# Patient Record
Sex: MALE
[De-identification: names, ages, dates, MRNs, and addresses within clinical notes are randomized per-mention and may not be internally consistent; named-entity substitution may affect disease eponyms.]

---

## 2022-06-28 PROBLEM — Z00.00 ENCOUNTER FOR PREVENTIVE HEALTH EXAMINATION: Status: ACTIVE | Noted: 2022-06-28

## 2022-06-29 ENCOUNTER — APPOINTMENT (OUTPATIENT)
Dept: RHEUMATOLOGY | Facility: CLINIC | Age: 72
End: 2022-06-29

## 2022-06-29 VITALS
HEART RATE: 70 BPM | WEIGHT: 115 LBS | TEMPERATURE: 97.2 F | SYSTOLIC BLOOD PRESSURE: 133 MMHG | HEIGHT: 63 IN | BODY MASS INDEX: 20.38 KG/M2 | DIASTOLIC BLOOD PRESSURE: 74 MMHG | OXYGEN SATURATION: 98 %

## 2022-06-29 DIAGNOSIS — Z86.39 PERSONAL HISTORY OF OTHER ENDOCRINE, NUTRITIONAL AND METABOLIC DISEASE: ICD-10-CM

## 2022-06-29 DIAGNOSIS — Z78.9 OTHER SPECIFIED HEALTH STATUS: ICD-10-CM

## 2022-06-29 DIAGNOSIS — I10 ESSENTIAL (PRIMARY) HYPERTENSION: ICD-10-CM

## 2022-06-29 DIAGNOSIS — N40.0 BENIGN PROSTATIC HYPERPLASIA WITHOUT LOWER URINARY TRACT SYMPMS: ICD-10-CM

## 2022-06-29 DIAGNOSIS — M17.10 UNILATERAL PRIMARY OSTEOARTHRITIS, UNSPECIFIED KNEE: ICD-10-CM

## 2022-06-29 PROCEDURE — 99204 OFFICE O/P NEW MOD 45 MIN: CPT | Mod: 25

## 2022-06-29 PROCEDURE — 20610 DRAIN/INJ JOINT/BURSA W/O US: CPT | Mod: RT

## 2022-06-29 RX ORDER — METHYLPRED ACET/NACL,ISO-OS/PF 80 MG/ML
80 VIAL (ML) INJECTION
Qty: 1 | Refills: 0 | Status: COMPLETED | OUTPATIENT
Start: 2022-06-29

## 2022-06-29 RX ORDER — LIDOCAINE HYDROCHLORIDE 10 MG/ML
1 INJECTION, SOLUTION INFILTRATION; PERINEURAL
Refills: 0 | Status: COMPLETED | OUTPATIENT
Start: 2022-06-29

## 2022-06-29 RX ORDER — METOPROLOL SUCCINATE 25 MG/1
25 TABLET, EXTENDED RELEASE ORAL
Qty: 90 | Refills: 0 | Status: ACTIVE | COMMUNITY
Start: 2022-02-12

## 2022-06-29 RX ORDER — ATORVASTATIN CALCIUM 40 MG/1
40 TABLET, FILM COATED ORAL
Qty: 90 | Refills: 0 | Status: ACTIVE | COMMUNITY
Start: 2022-02-12

## 2022-06-29 RX ORDER — TAMSULOSIN HYDROCHLORIDE 0.4 MG/1
0.4 CAPSULE ORAL
Qty: 90 | Refills: 0 | Status: ACTIVE | COMMUNITY
Start: 2022-06-29

## 2022-06-29 RX ADMIN — METHYLPREDNISOLONE ACETATE MG/ML: 80 INJECTION, SUSPENSION INTRA-ARTICULAR; INTRALESIONAL; INTRAMUSCULAR; SOFT TISSUE at 00:00

## 2022-06-29 RX ADMIN — Medication %: at 00:00

## 2022-06-29 NOTE — PHYSICAL EXAM
[General Appearance - Alert] : alert [General Appearance - In No Acute Distress] : in no acute distress [Sclera] : the sclera and conjunctiva were normal [PERRL With Normal Accommodation] : pupils were equal in size, round, and reactive to light [Extraocular Movements] : extraocular movements were intact [Outer Ear] : the ears and nose were normal in appearance [Oropharynx] : the oropharynx was normal [Neck Appearance] : the appearance of the neck was normal [Neck Cervical Mass (___cm)] : no neck mass was observed [Jugular Venous Distention Increased] : there was no jugular-venous distention [Thyroid Diffuse Enlargement] : the thyroid was not enlarged [Thyroid Nodule] : there were no palpable thyroid nodules [Auscultation Breath Sounds / Voice Sounds] : lungs were clear to auscultation bilaterally [Heart Rate And Rhythm] : heart rate was normal and rhythm regular [Heart Sounds] : normal S1 and S2 [Heart Sounds Gallop] : no gallops [Murmurs] : no murmurs [Heart Sounds Pericardial Friction Rub] : no pericardial rub [Full Pulse] : the pedal pulses are present [Edema] : there was no peripheral edema [Cervical Lymph Nodes Enlarged Posterior Bilaterally] : posterior cervical [Cervical Lymph Nodes Enlarged Anterior Bilaterally] : anterior cervical [Supraclavicular Lymph Nodes Enlarged Bilaterally] : supraclavicular [Axillary Lymph Nodes Enlarged Bilaterally] : axillary [No CVA Tenderness] : no ~M costovertebral angle tenderness [No Spinal Tenderness] : no spinal tenderness [Abnormal Walk] : normal gait [Nail Clubbing] : no clubbing  or cyanosis of the fingernails [Motor Tone] : muscle strength and tone were normal [Skin Color & Pigmentation] : normal skin color and pigmentation [Skin Turgor] : normal skin turgor [] : no rash [No Focal Deficits] : no focal deficits [Oriented To Time, Place, And Person] : oriented to person, place, and time [Impaired Insight] : insight and judgment were intact [Affect] : the affect was normal [FreeTextEntry1] : R knee swelling, bowing of the legs with joint line tenderness

## 2022-06-29 NOTE — HISTORY OF PRESENT ILLNESS
[Currently Experiencing] : currently [Arthralgias] : arthralgias [Joint Deformity] : joint deformity [Decreased ROM] : decreased range of motion [Difficulty Standing] : difficulty standing [Difficulty Walking] : difficulty walking [FreeTextEntry1] : 71 year old male here for evaluation of bilateral knee pain \par \par C/o pain in the R knee along with thigh and calf pain in the same leg. Has similar symptoms in the L knee but not as severe. Had x-ray last year and was told he had R knee effusion. Symptoms for the past 1 year. No pain at rest or when sleeping. Pain is mostly with walking, climbing stairs. Feels like he is losing his balance and his leg is giving way. He has not noticed the swelling in his knees. Has not taken any medications for his symptoms. Has not tried PT or any injections for his knee. Has not been evaluated by an orthopedic doctor. No other joint pains or swelling. \par \par Denies any constitutional symptoms - fevers, chills, malaise, weight loss, myalgias. No h/o rashes, uveitis, hair loss, headaches, mucosal ulcers, Raynaud's, sicca symptoms, GI or  issues, serositis, pleuritis, pericarditis, weakness or paresthesias. No h/o blood clots.\par  [Anorexia] : no anorexia [Weight Loss] : no weight loss [Malaise] : no malaise [Fever] : no fever [Chills] : no chills [Fatigue] : no fatigue [Depression] : no depression [Malar Facial Rash] : no malar facial rash [Skin Lesions] : no lesions [Skin Nodules] : no skin nodules [Oral Ulcers] : no oral ulcers [Cough] : no cough [Dry Mouth] : no dry mouth [Dysphonia] : no dysphonia [Dysphagia] : no dysphagia [Shortness of Breath] : no shortness of breath [Chest Pain] : no chest pain [Joint Swelling] : no joint swelling [Joint Warmth] : no joint warmth [Morning Stiffness] : no morning stiffness [Falls] : no falls [Dyspnea] : no dyspnea [Muscle Weakness] : no muscle weakness [Myalgias] : no myalgias [Muscle Spasms] : no muscle spasms [Muscle Cramping] : no muscle cramping [Visual Changes] : no visual changes [Eye Pain] : no eye pain [Eye Redness] : no eye redness [Dry Eyes] : no dry eyes

## 2022-06-29 NOTE — PROCEDURE
[Today's Date:] : Date: [unfilled] [Risks] : risks [Benefits] : benefits [Alternatives] : alternatives [Consent Obtained] : written consent was obtained prior to the procedure and is detailed in the patient's record [Patient] : Prior to the start of the procedure a time out was taken and the identity of the patient was confirmed via name and date of birth with the patient. The correct site and the procedure to be performed were confirmed. The correct side was confirmed if applicable. The availability of the correct equipment was verified [Diagnostic] : diagnostic  [Therapeutic] : therapeutic [#1 Site: ______] : #1 site identified in the [unfilled] [___ ml Inj] : [unfilled] ~Uml [1%] : 1%  [Without Epi] : without epinephrine [Chlorhexidine] : chlorhexidine [21 gauge 1.5 inch] : A 21 gauge 1.5 inch needle was used [___ml of fluid] : [unfilled] ml of fluid was aspirated [Crystal Identification] : crystal identification [Cell Count] : cell count [Gram Stain & Culture] : gram stain and culture [Depomedrol ___ mg] : Depomedrol [unfilled] mg [Tolerated Well] : the patient tolerated the procedure well [Reports Improvement in Symptoms] : reports improvement in symptoms [No Complications] : there were no complications [Instructions Given] : handouts/patient instructions were given to patient [Patient Instructed to Call] : patient was instructed to call if redness at site, a decrease in range of motion or an increase in pain is noted after procedure. [de-identified] : serosanguineous, clear

## 2022-06-29 NOTE — ASSESSMENT
[FreeTextEntry1] : 71 year old male here for evaluation of bilateral knee pain \par \par 1. Knee pain: R > L secondary to advance OA. Likely will need TKR in the near future. R knee arthrocentesis performed today with intra-articular steroid injection. Fluid sent for analysis. Please see procedure note for details. Referred for PT. \par \par Follow up in 3 months

## 2022-06-30 LAB
B PERT IGG+IGM PNL SER: ABNORMAL
COLOR FLD: YELLOW
EOSINOPHIL # FLD MANUAL: 0 %
FLUID INTAKE SUBSTANCE CLASS: NORMAL
LYMPHOCYTES # FLD MANUAL: 27 %
MESOTHL CELL NFR FLD: 0 %
MONOS+MACROS NFR FLD MANUAL: 69 %
NEUTS SEG # FLD MANUAL: 4 %
NRBC # FLD: 0 %
RBC # FLD MANUAL: 337 /UL
SYCRY CLARITY: ABNORMAL
SYCRY COLOR: YELLOW
SYCRY ID: NORMAL
SYCRY TUBE: NORMAL
TOTAL CELLS COUNTED FLD: 187 /UL
TUBE TYPE: NORMAL
UNIDENT CELLS NFR FLD MANUAL: 0 %
VARIANT LYMPHS # FLD MANUAL: 0 %

## 2022-07-14 LAB — BACTERIA FLD CULT: NORMAL

## 2024-09-23 ENCOUNTER — INPATIENT (INPATIENT)
Facility: HOSPITAL | Age: 74
LOS: 0 days | End: 2024-09-24
Attending: HOSPITALIST | Admitting: HOSPITALIST
Payer: MEDICARE

## 2024-09-23 VITALS
DIASTOLIC BLOOD PRESSURE: 83 MMHG | WEIGHT: 125 LBS | TEMPERATURE: 98 F | RESPIRATION RATE: 18 BRPM | HEART RATE: 70 BPM | OXYGEN SATURATION: 98 % | HEIGHT: 63 IN | SYSTOLIC BLOOD PRESSURE: 119 MMHG

## 2024-09-23 VITALS
OXYGEN SATURATION: 100 % | SYSTOLIC BLOOD PRESSURE: 123 MMHG | RESPIRATION RATE: 18 BRPM | HEART RATE: 70 BPM | DIASTOLIC BLOOD PRESSURE: 63 MMHG | TEMPERATURE: 98 F

## 2024-09-23 DIAGNOSIS — N40.0 BENIGN PROSTATIC HYPERPLASIA WITHOUT LOWER URINARY TRACT SYMPTOMS: ICD-10-CM

## 2024-09-23 DIAGNOSIS — E78.5 HYPERLIPIDEMIA, UNSPECIFIED: ICD-10-CM

## 2024-09-23 DIAGNOSIS — Z29.9 ENCOUNTER FOR PROPHYLACTIC MEASURES, UNSPECIFIED: ICD-10-CM

## 2024-09-23 DIAGNOSIS — K72.90 HEPATIC FAILURE, UNSPECIFIED WITHOUT COMA: ICD-10-CM

## 2024-09-23 DIAGNOSIS — R53.1 WEAKNESS: ICD-10-CM

## 2024-09-23 DIAGNOSIS — C22.1 INTRAHEPATIC BILE DUCT CARCINOMA: ICD-10-CM

## 2024-09-23 LAB
ADD ON TEST-SPECIMEN IN LAB: SIGNIFICANT CHANGE UP
ALBUMIN SERPL ELPH-MCNC: 2.9 G/DL — LOW (ref 3.3–5)
ALP SERPL-CCNC: 647 U/L — HIGH (ref 40–120)
ALT FLD-CCNC: 710 U/L — HIGH (ref 4–41)
ANION GAP SERPL CALC-SCNC: 13 MMOL/L — SIGNIFICANT CHANGE UP (ref 7–14)
APPEARANCE UR: ABNORMAL
APTT BLD: 43.3 SEC — HIGH (ref 24.5–35.6)
AST SERPL-CCNC: 2231 U/L — HIGH (ref 4–40)
BACTERIA # UR AUTO: NEGATIVE /HPF — SIGNIFICANT CHANGE UP
BASOPHILS # BLD AUTO: 0.05 K/UL — SIGNIFICANT CHANGE UP (ref 0–0.2)
BASOPHILS NFR BLD AUTO: 0.5 % — SIGNIFICANT CHANGE UP (ref 0–2)
BILIRUB SERPL-MCNC: 18 MG/DL — HIGH (ref 0.2–1.2)
BILIRUB UR-MCNC: ABNORMAL
BLD GP AB SCN SERPL QL: NEGATIVE — SIGNIFICANT CHANGE UP
BUN SERPL-MCNC: 27 MG/DL — HIGH (ref 7–23)
CALCIUM SERPL-MCNC: 8.8 MG/DL — SIGNIFICANT CHANGE UP (ref 8.4–10.5)
CAST: 0 /LPF — SIGNIFICANT CHANGE UP (ref 0–4)
CHLORIDE SERPL-SCNC: 97 MMOL/L — LOW (ref 98–107)
CO2 SERPL-SCNC: 21 MMOL/L — LOW (ref 22–31)
COLOR SPEC: SIGNIFICANT CHANGE UP
CREAT SERPL-MCNC: 0.89 MG/DL — SIGNIFICANT CHANGE UP (ref 0.5–1.3)
DIFF PNL FLD: ABNORMAL
EGFR: 89 ML/MIN/1.73M2 — SIGNIFICANT CHANGE UP
EOSINOPHIL # BLD AUTO: 0.02 K/UL — SIGNIFICANT CHANGE UP (ref 0–0.5)
EOSINOPHIL NFR BLD AUTO: 0.2 % — SIGNIFICANT CHANGE UP (ref 0–6)
GAS PNL BLDV: SIGNIFICANT CHANGE UP
GLUCOSE SERPL-MCNC: 122 MG/DL — HIGH (ref 70–99)
GLUCOSE UR QL: 100 MG/DL
HCT VFR BLD CALC: 33.2 % — LOW (ref 39–50)
HGB BLD-MCNC: 11.9 G/DL — LOW (ref 13–17)
IANC: 8.02 K/UL — HIGH (ref 1.8–7.4)
IMM GRANULOCYTES NFR BLD AUTO: 1.2 % — HIGH (ref 0–0.9)
INR BLD: 2.64 RATIO — HIGH (ref 0.85–1.18)
KETONES UR-MCNC: NEGATIVE MG/DL — SIGNIFICANT CHANGE UP
LEUKOCYTE ESTERASE UR-ACNC: ABNORMAL
LIDOCAIN IGE QN: 67 U/L — HIGH (ref 7–60)
LYMPHOCYTES # BLD AUTO: 0.96 K/UL — LOW (ref 1–3.3)
LYMPHOCYTES # BLD AUTO: 9.2 % — LOW (ref 13–44)
MCHC RBC-ENTMCNC: 29.2 PG — SIGNIFICANT CHANGE UP (ref 27–34)
MCHC RBC-ENTMCNC: 35.8 GM/DL — SIGNIFICANT CHANGE UP (ref 32–36)
MCV RBC AUTO: 81.6 FL — SIGNIFICANT CHANGE UP (ref 80–100)
MONOCYTES # BLD AUTO: 1.26 K/UL — HIGH (ref 0–0.9)
MONOCYTES NFR BLD AUTO: 12.1 % — SIGNIFICANT CHANGE UP (ref 2–14)
NEUTROPHILS # BLD AUTO: 8.02 K/UL — HIGH (ref 1.8–7.4)
NEUTROPHILS NFR BLD AUTO: 76.8 % — SIGNIFICANT CHANGE UP (ref 43–77)
NITRITE UR-MCNC: NEGATIVE — SIGNIFICANT CHANGE UP
NRBC # BLD: 0 /100 WBCS — SIGNIFICANT CHANGE UP (ref 0–0)
NRBC # FLD: 0.03 K/UL — HIGH (ref 0–0)
PH UR: 6 — SIGNIFICANT CHANGE UP (ref 5–8)
PLATELET # BLD AUTO: 254 K/UL — SIGNIFICANT CHANGE UP (ref 150–400)
POTASSIUM SERPL-MCNC: 4.6 MMOL/L — SIGNIFICANT CHANGE UP (ref 3.5–5.3)
POTASSIUM SERPL-SCNC: 4.6 MMOL/L — SIGNIFICANT CHANGE UP (ref 3.5–5.3)
PROT SERPL-MCNC: 6.3 G/DL — SIGNIFICANT CHANGE UP (ref 6–8.3)
PROT UR-MCNC: 300 MG/DL
PROTHROM AB SERPL-ACNC: 28.7 SEC — HIGH (ref 9.5–13)
RBC # BLD: 4.07 M/UL — LOW (ref 4.2–5.8)
RBC # FLD: 24.2 % — HIGH (ref 10.3–14.5)
RBC CASTS # UR COMP ASSIST: 28 /HPF — HIGH (ref 0–4)
REVIEW: SIGNIFICANT CHANGE UP
RH IG SCN BLD-IMP: POSITIVE — SIGNIFICANT CHANGE UP
SODIUM SERPL-SCNC: 131 MMOL/L — LOW (ref 135–145)
SP GR SPEC: 1.02 — SIGNIFICANT CHANGE UP (ref 1–1.03)
SQUAMOUS # UR AUTO: 1 /HPF — SIGNIFICANT CHANGE UP (ref 0–5)
UROBILINOGEN FLD QL: 0.2 MG/DL — SIGNIFICANT CHANGE UP (ref 0.2–1)
WBC # BLD: 10.44 K/UL — SIGNIFICANT CHANGE UP (ref 3.8–10.5)
WBC # FLD AUTO: 10.44 K/UL — SIGNIFICANT CHANGE UP (ref 3.8–10.5)
WBC UR QL: 0 /HPF — SIGNIFICANT CHANGE UP (ref 0–5)

## 2024-09-23 PROCEDURE — 70460 CT HEAD/BRAIN W/DYE: CPT | Mod: 26,MC

## 2024-09-23 PROCEDURE — 74177 CT ABD & PELVIS W/CONTRAST: CPT | Mod: 26,MC

## 2024-09-23 PROCEDURE — 72126 CT NECK SPINE W/DYE: CPT | Mod: 26,MC

## 2024-09-23 PROCEDURE — 99233 SBSQ HOSP IP/OBS HIGH 50: CPT | Mod: GC

## 2024-09-23 PROCEDURE — 71260 CT THORAX DX C+: CPT | Mod: 26,MC

## 2024-09-23 PROCEDURE — 72129 CT CHEST SPINE W/DYE: CPT | Mod: 26,MC

## 2024-09-23 PROCEDURE — 99285 EMERGENCY DEPT VISIT HI MDM: CPT

## 2024-09-23 PROCEDURE — 72132 CT LUMBAR SPINE W/DYE: CPT | Mod: 26,MC

## 2024-09-23 RX ORDER — TAMSULOSIN HCL 0.4 MG
0.4 CAPSULE ORAL AT BEDTIME
Refills: 0 | Status: DISCONTINUED | OUTPATIENT
Start: 2024-09-23 | End: 2024-09-24

## 2024-09-23 RX ORDER — SODIUM CHLORIDE 0.9 % (FLUSH) 0.9 %
1000 SYRINGE (ML) INJECTION ONCE
Refills: 0 | Status: COMPLETED | OUTPATIENT
Start: 2024-09-23 | End: 2024-09-23

## 2024-09-23 RX ORDER — ATORVASTATIN CALCIUM 10 MG/1
80 TABLET, FILM COATED ORAL AT BEDTIME
Refills: 0 | Status: DISCONTINUED | OUTPATIENT
Start: 2024-09-23 | End: 2024-09-24

## 2024-09-23 RX ORDER — ENOXAPARIN SODIUM 150 MG/ML
40 INJECTION SUBCUTANEOUS EVERY 24 HOURS
Refills: 0 | Status: DISCONTINUED | OUTPATIENT
Start: 2024-09-23 | End: 2024-09-23

## 2024-09-23 RX ORDER — ACETAMINOPHEN 325 MG
650 TABLET ORAL EVERY 6 HOURS
Refills: 0 | Status: DISCONTINUED | OUTPATIENT
Start: 2024-09-23 | End: 2024-09-24

## 2024-09-23 RX ORDER — 5-HYDROXYTRYPTOPHAN (5-HTP) 100 MG
3 TABLET,DISINTEGRATING ORAL AT BEDTIME
Refills: 0 | Status: DISCONTINUED | OUTPATIENT
Start: 2024-09-23 | End: 2024-09-24

## 2024-09-23 RX ORDER — INFLUENZA VIRUS VACCINE 15; 15; 15; 15 UG/.5ML; UG/.5ML; UG/.5ML; UG/.5ML
0.5 SUSPENSION INTRAMUSCULAR ONCE
Refills: 0 | Status: DISCONTINUED | OUTPATIENT
Start: 2024-09-23 | End: 2024-09-24

## 2024-09-23 RX ORDER — MAG HYDROX/ALUMINUM HYD/SIMETH 200-200-20
30 SUSPENSION, ORAL (FINAL DOSE FORM) ORAL EVERY 4 HOURS
Refills: 0 | Status: DISCONTINUED | OUTPATIENT
Start: 2024-09-23 | End: 2024-09-24

## 2024-09-23 RX ORDER — ONDANSETRON HCL/PF 4 MG/2 ML
4 VIAL (ML) INJECTION EVERY 8 HOURS
Refills: 0 | Status: DISCONTINUED | OUTPATIENT
Start: 2024-09-23 | End: 2024-09-24

## 2024-09-23 RX ORDER — METOPROLOL TARTRATE 50 MG
25 TABLET ORAL DAILY
Refills: 0 | Status: DISCONTINUED | OUTPATIENT
Start: 2024-09-23 | End: 2024-09-24

## 2024-09-23 RX ORDER — SODIUM CHLORIDE 0.9 % (FLUSH) 0.9 %
1000 SYRINGE (ML) INJECTION
Refills: 0 | Status: DISCONTINUED | OUTPATIENT
Start: 2024-09-23 | End: 2024-09-24

## 2024-09-23 RX ADMIN — Medication 5000 UNIT(S): at 22:23

## 2024-09-23 RX ADMIN — Medication 4 MILLIGRAM(S): at 19:38

## 2024-09-23 RX ADMIN — Medication 100 MILLILITER(S): at 18:47

## 2024-09-23 RX ADMIN — Medication 0.4 MILLIGRAM(S): at 22:23

## 2024-09-23 RX ADMIN — ATORVASTATIN CALCIUM 80 MILLIGRAM(S): 10 TABLET, FILM COATED ORAL at 22:22

## 2024-09-23 RX ADMIN — Medication 1000 MILLILITER(S): at 12:52

## 2024-09-23 NOTE — H&P ADULT - NSHPPHYSICALEXAM_GEN_ALL_CORE
LOS:     VITALS:   T(C): 36.3 (09-23-24 @ 12:45), Max: 36.7 (09-23-24 @ 10:03)  HR: 75 (09-23-24 @ 12:45) (70 - 75)  BP: 118/63 (09-23-24 @ 12:45) (118/63 - 119/83)  RR: 18 (09-23-24 @ 12:45) (18 - 18)  SpO2: 100% (09-23-24 @ 12:45) (98% - 100%)    GENERAL: NAD  HEAD:  Atraumatic, Normocephalic  EYES: EOMI, PERRLA, conjunctiva and sclera clear  ENT: Moist mucous membranes  NECK: Supple, No elevated JVP.  CHEST/LUNG: Clear to auscultation bilaterally; No rales, rhonchi, or wheezes.   HEART: Regular rate and rhythm; No murmurs, rubs, or gallops  ABDOMEN: Bowel sounds present; Soft, nontender, nondistended  EXTREMITIES:  2+ Peripheral Pulses, brisk capillary refill. No clubbing, cyanosis, or edema  NERVOUS SYSTEM:  A&Ox3, no focal deficits   SKIN: No rashes or lesions LOS:     VITALS:   T(C): 36.3 (09-23-24 @ 12:45), Max: 36.7 (09-23-24 @ 10:03)  HR: 75 (09-23-24 @ 12:45) (70 - 75)  BP: 118/63 (09-23-24 @ 12:45) (118/63 - 119/83)  RR: 18 (09-23-24 @ 12:45) (18 - 18)  SpO2: 100% (09-23-24 @ 12:45) (98% - 100%)    GENERAL: NAD, appears weak  HEAD:  Atraumatic, Normocephalic  EYES: EOMI, PERRLA, scleral icterus  ENT: Moist mucous membranes  NECK: Supple, No elevated JVP.  CHEST/LUNG: Clear to auscultation bilaterally; No rales, rhonchi, or wheezes.   HEART: Regular rate and rhythm; No murmurs, rubs, or gallops  ABDOMEN: Bowel sounds present; Soft, tenderness to palpitation to RUQ, nondistended  EXTREMITIES:  2+ Peripheral Pulses, brisk capillary refill. No clubbing, cyanosis, or edema  NERVOUS SYSTEM:  A&Ox3, no focal deficits   SKIN: +Jaundice LOS:     VITALS:   T(C): 36.3 (09-23-24 @ 12:45), Max: 36.7 (09-23-24 @ 10:03)  HR: 75 (09-23-24 @ 12:45) (70 - 75)  BP: 118/63 (09-23-24 @ 12:45) (118/63 - 119/83)  RR: 18 (09-23-24 @ 12:45) (18 - 18)  SpO2: 100% (09-23-24 @ 12:45) (98% - 100%)    GENERAL: NAD, appears weak  HEAD:  Atraumatic, Normocephalic  EYES: EOMI, PERRLA, scleral icterus  ENT: Moist mucous membranes  NECK: Supple, No elevated JVP.  CHEST/LUNG: Clear to auscultation bilaterally; No rales, rhonchi, or wheezes.   HEART: Regular rate and rhythm; No murmurs, rubs, or gallops  ABDOMEN: Bowel sounds present; Soft, tenderness to palpitation to RUQ, nondistended  EXTREMITIES:  2+ Peripheral Pulses, brisk capillary refill. No clubbing, cyanosis, or edema  NERVOUS SYSTEM:  A&Ox3, no focal deficits but strength 3/5 UE- able to hold his arms up, 2/5 LE bilateral, sensation intact  SKIN: +Jaundice

## 2024-09-23 NOTE — CONSULT NOTE ADULT - ASSESSMENT
74y (1950) hx BPH HLD cholangiocarcinoma recently diagnosed 9/10/24 attempted biliary stenting on 9/18 which failed. Patient subsequently experienced weakness in the right leg since then that has progressively worsened to involve bilateral lower limbs alongside generalized weakness. Exam reveals proximal bilateral lower limbs weakness. CT brain and CT C T L spine are not concerning.    impression: bilateral lower limb weakness more pronounced on the right could be related to central etiology however no findings to suggest acute cord compression on examination or CT obtained. Given history of cancer, would obtain imaging of neuroaxis. However, since poximal rule out medication induced vs metabolic, work up to clarify    plan:  [] MRI brain with and without contrast   [] MRI cervical thoracic and lumbar spine with and without contrast  [] check CPK   [] PT OT    case to be seen with Dr. Cornejo in AM

## 2024-09-23 NOTE — PATIENT PROFILE ADULT - FALL HARM RISK - HARM RISK INTERVENTIONS
Assistance with ambulation/Assistance OOB with selected safe patient handling equipment/Communicate Risk of Fall with Harm to all staff/Discuss with provider need for PT consult/Monitor gait and stability/Reinforce activity limits and safety measures with patient and family/Tailored Fall Risk Interventions/Visual Cue: Yellow wristband and red socks/Bed in lowest position, wheels locked, appropriate side rails in place/Call bell, personal items and telephone in reach/Instruct patient to call for assistance before getting out of bed or chair/Non-slip footwear when patient is out of bed/[place] to call system/Physically safe environment - no spills, clutter or unnecessary equipment/Purposeful Proactive Rounding/Room/bathroom lighting operational, light cord in reach

## 2024-09-23 NOTE — CONSULT NOTE ADULT - SUBJECTIVE AND OBJECTIVE BOX
Neurology - Consult Note    -  Spectra: [phone] ([facility]), [phone] ([facility])  -    HPI: Patient DANNI KRAUS is a 74y (1950) hx BPH HLD cholangiocarcinoma recently diagnosed 9/10/24 attempted biliary stenting on 9/18 which failed. Patient subsequently experienced weakness in the right leg since then that has progressively worsened to involve bilateral lower limbs alongside generalized weakness. Patient is currently unable to ambulate due to the weakness that he developed at this point. He denies any sensory loss, speech changes, visual changes or headaches. He has right chronic knee pain otherwise. He is urinating without any recent acute changes and has normal bowel movements. He denies any recent infection, trauma or fall. He denies any recent vaccination. Of note, he follows at [facility] where he underwent the procedure. [place] tumor was diagnosed after patient developed jaundice.     Review of Systems:    NEUROLOGICAL: +As stated in HPI above    All other review of systems is negative unless indicated above.    Allergies:  No Known Allergies    PMHx/PSHx/Family Hx: As above, otherwise see below   Cholangiocarcinoma    BPH (benign prostatic hyperplasia)    HLD (hyperlipidemia)    Social Hx:  No current use of tobacco, alcohol, or illicit drugs    Medications:  MEDICATIONS  (STANDING):  atorvastatin 80 milliGRAM(s) Oral at bedtime  heparin   Injectable 5000 Unit(s) SubCutaneous every 8 hours  metoprolol succinate ER 25 milliGRAM(s) Oral daily  sodium chloride 0.9%. 1000 milliLiter(s) (100 mL/Hr) IV Continuous <Continuous>  tamsulosin 0.4 milliGRAM(s) Oral at bedtime    MEDICATIONS  (PRN):      Vitals:  T(C): 36.5 (09-23-24 @ 15:39), Max: 36.7 (09-23-24 @ 10:03)  HR: 72 (09-23-24 @ 15:39) (70 - 75)  BP: 110/59 (09-23-24 @ 15:39) (110/59 - 119/83)  RR: 18 (09-23-24 @ 15:39) (18 - 18)  SpO2: 100% (09-23-24 @ 15:39) (98% - 100%)    Physical Examination:   Neurologic Exam:  Mental status - Awake, Alert, Oriented to person, place, and time. Speech fluent, repetition and naming intact. Follows simple and complex commands.   Cranial nerves - VFF, EOMI, face sensation (V1-V3) intact b/l, facial strength intact without asymmetry b/l, hearing intact b/l, tongue midline on protrusion with full lateral movement    Motor - Normal bulk and tone throughout. No pronator drift.  Strength testing            Deltoid      Biceps      Triceps     Wrist Extension                     Wrist Flexion     Interossei         R            5                 5               5                     5                              5                        5                 5  L             5                 5               5                     5                              5                        5                 5              Hip Flexion    Hip Extension    Knee Flexion    Knee Extension    Dorsiflexion    Plantar Flexion  R            3                           5                       5                           5                            5                          5  L            4                          5                        5                           5                            5                          5  there is motor power as above when testing each muscle group however does not lift against gravity and does not maintain against gravity in the lower limbs  Sensation - pinprick  intact throughout vibration and proprioception intact in bilateral lower limbs     DTR's -             Biceps      Triceps     Brachioradialis                Patellar    Ankle    Toes/plantar response  R             2+             2+                  2+                       2+            2+                 Down  L              2+             2+                 2+                        2+           2+                 Down  negative Noriega sign in bilateral upper limbs  Coordination - Finger to Nose intact b/l. No tremors appreciated. HTS not tested since patient not lifting legs from bed and drifts to bed    Gait and station - Not attempted.    Labs:                        11.9   10.44 )-----------( 254      ( 23 Sep 2024 11:15 )             33.2     09-23    131[L]  |  97[L]  |  27[H]  ----------------------------<  122[H]  4.6   |  21[L]  |  0.89    Ca    8.8      23 Sep 2024 11:15    TPro  6.3  /  Alb  2.9[L]  /  TBili  18.0[H]  /  DBili  x   /  AST  2231[H]  /  ALT  710[H]  /  AlkPhos  647[H]  09-23    CAPILLARY BLOOD GLUCOSE      POCT Blood Glucose.: 147 mg/dL (23 Sep 2024 10:26)    LIVER FUNCTIONS - ( 23 Sep 2024 11:15 )  Alb: 2.9 g/dL / Pro: 6.3 g/dL / ALK PHOS: 647 U/L / ALT: 710 U/L / AST: 2231 U/L / GGT: x             Urinalysis with Rflx Culture (collected 23 Sep 2024 11:25)      PT/INR - ( 23 Sep 2024 11:15 )   PT: 28.7 sec;   INR: 2.64 ratio         PTT - ( 23 Sep 2024 11:15 )  PTT:43.3 sec  CSF:                  Radiology:    < from: CT Cervical Spine w/ IV Cont (09.23.24 @ 12:34) >    IMPRESSION:    1.  Degenerative changes without an acute fracture.  2.  MRI would be more sensitive for metastatic disease.      < end of copied text >  < from: CT Thoracic Spine Reform w/ IV Cont (09.23.24 @ 12:33) >  IMPRESSION:    1.  THORACIC SPINE: No destructive bone lesion to suggest metastasis    2.  LUMBAR SPINE:   No destructive bone lesion to suggest metastasis    3. Please see separately reported body CT. for additional findings    < end of copied text >  < from: CT Head w/ IV Cont (09.23.24 @ 12:32) >    IMPRESSION:  No CT evidence of intracranial metastases. Please note that MRI with   contrast would offer more sensitive evaluation for intracranial   metastatic disease.      < end of copied text >

## 2024-09-23 NOTE — H&P ADULT - NSICDXPASTMEDICALHX_GEN_ALL_CORE_FT
PAST MEDICAL HISTORY:  BPH (benign prostatic hyperplasia)     Cholangiocarcinoma     HLD (hyperlipidemia)

## 2024-09-23 NOTE — H&P ADULT - PROBLEM SELECTOR PLAN 6
- DVT: Lovenox  - Diet: regular   - Dispo: pending - DVT: Heparin subq q8  - Diet: regular   - Dispo: pending

## 2024-09-23 NOTE — H&P ADULT - PROBLEM SELECTOR PLAN 2
- Severe jaundice, elevated bili to 18, Alk Phos 647, AST 2231,   - Weakness may be secondary to elevated lactate of 6.7  - MELD 30  - GI consulted - Severe jaundice, elevated bili to 18, Alk Phos 647, AST 2231,   - No asterixis   - MELD 30  - check ammonia  - GI consulted

## 2024-09-23 NOTE — H&P ADULT - ASSESSMENT
73 y/o male with pmhx of cholangiocarcinoma, s/p failed biliary stent placement, BPH, and HLD, admitted for weakness, mainly in the bilateral lower extremities x 3 days.  Given history of cholangiocarcinoma.  Concerns for metastasis of disease.  Also concerns for worsening biliary obstruction. Will gotti scan to evaluate for metastasis. Will obtain lab work, including CBC, CMP, VBG. Will consult neurology.  73 y/o male with pmhx of rent diagnosis of cholangiocarcinoma s/p failed biliary stent placement due to mass size on 9/18, BPH, and HLD, admitted for weakness, mainly in the bilateral lower extremities x 3 days.          Given history of cholangiocarcinoma, Concerns for metastasis of disease.  Also concerns for worsening biliary obstruction. Will gotti scan to evaluate for metastasis. Will obtain lab work, including CBC, CMP, VBG.  75 y/o male with pmhx of rent diagnosis of cholangiocarcinoma s/p failed biliary stent placement due to mass size on 9/18, BPH, and HLD, admitted for weakness, mainly in the bilateral lower extremities x 3 days. Exam significant for jaundice, scleral icterus, and strength weakness in all extremities. Labs notable for lactate of 6.7, BUN 27, T Bili 18, Alk Phos 647, AST 2231, . lipase 67. pH 7.3. UA sig for large blood, large bili, small leuk.     CT CAP with a mass at the confluence of the biliary tree concerning for cholangiocarcinoma with vascular involvement as above. Moderate to severe right and left intrahepatic biliary ductal dilatation to the level of the tumor. Isolated right and left hepatic ducts. CT C spine with degenerative.    GI consulted for possible ERCP.    73 y/o male with pmhx of rent diagnosis of cholangiocarcinoma s/p failed biliary stent placement due to mass size on 9/18, BPH, and HLD, admitted for weakness, mainly in the bilateral lower extremities x 3 days. Exam significant for jaundice, scleral icterus, and strength weakness in all extremities. Labs notable for lactate of 6.7, BUN 27, T Bili 18, Alk Phos 647, AST 2231, . lipase 67. pH 7.3. UA sig for large blood, large bili, small leuk.     CT CAP with a mass at the confluence of the biliary tree concerning for cholangiocarcinoma with vascular involvement as above. Moderate to severe right and left intrahepatic biliary ductal dilatation to the level of the tumor. Isolated right and left hepatic ducts. CT C spine with degenerative changes CT T and L spine without destructive changes    GI consulted for possible ERCP.

## 2024-09-23 NOTE — H&P ADULT - PROBLEM SELECTOR PLAN 1
- Pt recent diagnosis of cholangiocarcinoma, mass of 1.6x1.9cm on MRI, s/p ECRP with biliary sphincterotomy on 9/18. Unable to place stent 2/2 mass size  -s/p 3 days of Cipro 500mg and 1 day of Augmentin 875mg for prophylaxis   - GI consulted, potential ERCP    -[facility]

## 2024-09-23 NOTE — H&P ADULT - NSHPREVIEWOFSYSTEMS_GEN_ALL_CORE
CONSTITUTIONAL: + weakness, no fevers or chills  EYES/ENT: No visual changes;  No vertigo or throat pain   NECK: No pain or stiffness  RESPIRATORY: No cough, wheezing, hemoptysis; No shortness of breath  CARDIOVASCULAR: No chest pain or palpitations  GASTROINTESTINAL: No abdominal or epigastric pain. No nausea, vomiting, or hematemesis; No diarrhea or constipation. No melena or hematochezia.  GENITOURINARY: No dysuria, frequency or hematuria  NEUROLOGICAL: No numbness, +weakness  SKIN: No itching, rashes

## 2024-09-23 NOTE — H&P ADULT - ATTENDING COMMENTS
Pt seen and examined on 9/23/24 at 5pm  75 yo M h/o recently diagnosis of cholangiocarcinoma s/p failed biliary stent placement on 9/18, BPH p/w weakness and Jaundice. Son at bedside.   Pt denies any pain, no N/V.   # Cholangiocarcinoma  # Jaundice   # Generalized weakness   #BPH  - Consult GI for possible ERCP  - Consult oncology  - lactate elevated- start IVFs and trend lactate   - PT consult. Nutrition consult

## 2024-09-23 NOTE — H&P ADULT - PROBLEM SELECTOR PLAN 3
- Continue home med of Tamsulosin daily - Weakness may be secondary to elevated lactate of 6.7.  - CT C spine with degenerative changes  - CT T/L - no destructive changes  - No signs of cord compression

## 2024-09-23 NOTE — H&P ADULT - NSHPLABSRESULTS_GEN_ALL_CORE
.  LABS:                         11.9   10.44 )-----------( 254      ( 23 Sep 2024 11:15 )             33.2         131[L]  |  97[L]  |  27[H]  ----------------------------<  122[H]  4.6   |  21[L]  |  0.89    Ca    8.8      23 Sep 2024 11:15    TPro  6.3  /  Alb  2.9[L]  /  TBili  18.0[H]  /  DBili  x   /  AST  2231[H]  /  ALT  710[H]  /  AlkPhos  647[H]      PT/INR - ( 23 Sep 2024 11:15 )   PT: 28.7 sec;   INR: 2.64 ratio         PTT - ( 23 Sep 2024 11:15 )  PTT:43.3 sec  Urinalysis Basic - ( 23 Sep 2024 11:25 )    Color: Dark Yellow / Appearance: Cloudy / S.024 / pH: x  Gluc: x / Ketone: Negative mg/dL  / Bili: Large / Urobili: 0.2 mg/dL   Blood: x / Protein: 300 mg/dL / Nitrite: Negative   Leuk Esterase: Small / RBC: 28 /HPF / WBC 0 /HPF   Sq Epi: x / Non Sq Epi: 1 /HPF / Bacteria: Negative /HPF        Lactate, Blood: 6.7 mmol/L ( @ 14:19)      RADIOLOGY, EKG & ADDITIONAL TESTS: Reviewed.

## 2024-09-23 NOTE — ED ADULT NURSE NOTE - OBJECTIVE STATEMENT
presents for extreme lethargy x this morning, but worsening over the course of the last week. recent diagnosis of liver/bile duct CA. endorses having decreased ability to ambulate. no n/v/d/headahce/fever/chills. Denies sob/cp. pt is aao x 4, speaking in complete sentences, no drooling noted. RR even and unlabored.

## 2024-09-23 NOTE — H&P ADULT - PROBLEM SELECTOR PLAN 4
- Continue home med of Atorvastatin 80mg and Metoprolol ER 25mg - Continue home med of Tamsulosin daily

## 2024-09-23 NOTE — H&P ADULT - HISTORY OF PRESENT ILLNESS
75 y/o male , hx of BPH, HLD, and recent diagnosis of cholangiocarcinoma, diagnosed last week, s/p failed biliary stent placement presents to ED for weakness, mainly in the bilateral lower extremities x 3 days.  Reports that weakness started first in the right lower extremity about 3 days ago, was able to walk but with difficulty.     However this morning, it acutely worsened to the point that he was unable to stand without support.  Denies loss of sensation, fever, chills, recent illness including cough, congestion, chest pain, shortness of breath, abdominal pain, dysuria, or any other complaints.     Outpatient labs provided by patient showing , , bili 11.7, alk phos 852.    MRI showed mass measuring 1.6x1.9cm at the level of the common hepatic duct junction.      PCP: Dr Marilynn Valles  75 y/o male with pmhx of BPH, HLD, and recent diagnosis of cholangiocarcinoma, diagnosed on 9/10, s/p failed biliary stent placement 9/18 presents to ED for weakness, mainly in the bilateral lower extremities x 3 days. Reports that weakness started first in the right lower extremity about 3 days ago, was able to walk but with difficulty. However this morning, it acutely worsened to the point that he was unable to stand without support.  Denies loss of sensation, fever, chills, recent illness including cough, congestion, chest pain, shortness of breath, abdominal pain, dysuria, or any other complaints. Denies weight loss.     Per son, Velma, pt was in his usual state of health until 8/25 when the family noticed his eyes and skin were yellow. Pt was then evaluated by his PCP, Dr. Marilynn Valles - US 9/4/24 new biliary dilatation and thickened gallbladder wall up to 3mm. 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     Was then evaluated by GI doctor, Dr. Wade White at [facility], performed ERCP on 9/18 - biliary tract obstruction 2/2 a mass in the upper third of the main duct with malignant appearing severe biliary stricture; biliary sphincterotomy was performed. Was planning for percutaneous drainage. Never had a EGD or colonoscopy. Patient also finished 3 days of Cipro 500mg BID. Went to [place] ED for weakness on 9/21 morning, dc-ed with Augmentin 875mg which pt took 2 dose of it.     PCP: Dr Marilynn Valles   GI: Dr. Wade White at [facility]

## 2024-09-23 NOTE — H&P ADULT - PROBLEM SELECTOR PLAN 5
- DVT:   - Diet: regular  - Dispo: pending - Continue home med of Atorvastatin 80mg and Metoprolol ER 25mg

## 2024-09-23 NOTE — ED ADULT NURSE NOTE - NSFALLHARMRISKINTERV_ED_ALL_ED
Assistance OOB with selected safe patient handling equipment if applicable/Assistance with ambulation/Communicate risk of Fall with Harm to all staff, patient, and family/Monitor gait and stability/Provide visual cue: red socks, yellow wristband, yellow gown, etc/Reinforce activity limits and safety measures with patient and family/Bed in lowest position, wheels locked, appropriate side rails in place/Call bell, personal items and telephone in reach/Instruct patient to call for assistance before getting out of bed/chair/stretcher/Non-slip footwear applied when patient is off stretcher/[place] to call system/Physically safe environment - no spills, clutter or unnecessary equipment/Purposeful Proactive Rounding/Room/bathroom lighting operational, light cord in reach

## 2024-09-23 NOTE — ED PROVIDER NOTE - CLINICAL SUMMARY MEDICAL DECISION MAKING FREE TEXT BOX
73 y/o male , hx of BPH and HLD, presentin to [facility] ED today for weakness. Had recent diagnosis of cholangiocarcinoma s/p failed biliary stent placement. He is seen to the ED today for weakness, mainly in the bilateral lower extremities.  Reports that it started first in the right lower extremity about 3 days ago, was able to walk but with difficulty.  However this morning, it acutely worsened to the point that he was unable to stand without support.  No loss of sensation, no chest pain, no shortness of breath.  Also no fever, congestion, cough.  No abdominal pain.  No urinary issues.  Outpatient labs provided by patient showing , , bili 11.7, alk phos 852.  MRI showed mass measuring 1.6x1.9cm at the level of the common hepatic duct junction.    PCP: Dr Marilynn Valles     - Vital signs stable. Patient is afebrile, not tachycardic  - Lying on stretcher in NAD  - A&Ox3 and is well-appearing  - Head is atraumatic, normal conjunctiva  - Neck is supple with normal ROM   - Mucous membranes are moist  - Lungs are clear to auscultation b/l, no wheezing, rales, or rhonchi  - Normal S1, S2, no murmurs, rubs or gallops  - Abdomen is soft and nontender with normal bowel sounds in all 4 quadrants  - No lower extremity edema noted  - 5/5 strength in both upper and lower extremities. CN II-XII grossly intact. No focal neurological deficits.     Given history of cholangiocarcinoma.  Concerns for metastasis of disease.  Also concerns for worsening biliary obstruction. Will gotti scan to evaluate for metastasis. Will obtain lab work, including CBC, CMP, VBG. Will consult neurology. Anticipate admission. Blank att: 75 y/o male , hx of BPH and HLD, presentin to [facility] ED today for weakness. Had recent diagnosis of cholangiocarcinoma s/p failed biliary stent placement. He is seen to the ED today for weakness, mainly in the bilateral lower extremities.  Reports that it started first in the right lower extremity about 3 days ago, was able to walk but with difficulty.  However this morning, it acutely worsened to the point that he was unable to stand without support.  No loss of sensation, no chest pain, no shortness of breath.  Also no fever, congestion, cough.  No abdominal pain.  No urinary issues.  Outpatient labs provided by patient showing , , bili 11.7, alk phos 852.  MRI showed mass measuring 1.6x1.9cm at the level of the common hepatic duct junction.    PCP: Dr Marilynn Valles     - Vital signs stable. Patient is afebrile, not tachycardic  - Lying on stretcher in NAD  - A&Ox3 and is well-appearing  - Head is atraumatic, normal conjunctiva  - Neck is supple with normal ROM   - Mucous membranes are moist  - Lungs are clear to auscultation b/l, no wheezing, rales, or rhonchi  - Normal S1, S2, no murmurs, rubs or gallops  - Abdomen is soft and nontender with normal bowel sounds in all 4 quadrants  - No lower extremity edema noted  - 5/5 strength in both upper and lower extremities. CN II-XII grossly intact. No focal neurological deficits.     Given history of cholangiocarcinoma.  Concerns for metastasis of disease.  Also concerns for worsening biliary obstruction. Will gotti scan to evaluate for metastasis. Will obtain lab work, including CBC, CMP, VBG. Will consult neurology. Anticipate admission.

## 2024-09-23 NOTE — ED PROVIDER NOTE - PROGRESS NOTE DETAILS
Saint Pavan, DO (PGY2): neurology consulted. Will come see patient in the ED Saint Pavan, DO (PGY2): patient with elevated transaminases. CT's with no evidence of  mets. Pending neuro eval. Spoke to Dr. Henry, hospitalist, regarding case. He accepted the patient. GI consult placed.

## 2024-09-23 NOTE — ED ADULT TRIAGE NOTE - CHIEF COMPLAINT QUOTE
c/o worsening weakness for the past 2 weeks, unable to get up today, reports last week was diagnosed with liver CA with bile duct obstruction 1 week ago, pt is jaundice in triage, denies fever chills, repros  able to maintain PO intake.

## 2024-09-24 LAB
CULTURE RESULTS: SIGNIFICANT CHANGE UP
HCT VFR BLD CALC: 22.6 % — LOW (ref 39–50)
HGB BLD-MCNC: 8.2 G/DL — LOW (ref 13–17)
LACTATE SERPL-SCNC: 10.2 MMOL/L — CRITICAL HIGH (ref 0.5–2)
MCHC RBC-ENTMCNC: 30.3 PG — SIGNIFICANT CHANGE UP (ref 27–34)
MCHC RBC-ENTMCNC: 36.3 GM/DL — HIGH (ref 32–36)
MCV RBC AUTO: 83.4 FL — SIGNIFICANT CHANGE UP (ref 80–100)
MRSA PCR RESULT.: SIGNIFICANT CHANGE UP
NRBC # BLD: 1 /100 WBCS — HIGH (ref 0–0)
NRBC # FLD: 0.13 K/UL — HIGH (ref 0–0)
PLATELET # BLD AUTO: 246 K/UL — SIGNIFICANT CHANGE UP (ref 150–400)
RBC # BLD: 2.71 M/UL — LOW (ref 4.2–5.8)
RBC # FLD: 24.3 % — HIGH (ref 10.3–14.5)
S AUREUS DNA NOSE QL NAA+PROBE: SIGNIFICANT CHANGE UP
SPECIMEN SOURCE: SIGNIFICANT CHANGE UP
WBC # BLD: 13.33 K/UL — HIGH (ref 3.8–10.5)
WBC # FLD AUTO: 13.33 K/UL — HIGH (ref 3.8–10.5)

## 2024-09-24 RX ADMIN — Medication 30 MILLILITER(S): at 00:05

## 2024-09-24 NOTE — CHART NOTE - NSCHARTNOTEFT_GEN_A_CORE
DEATH NOTE    Called to bedside to evaluate the patient for .     On physical exam, patient did not respond to verbal or noxious stimuli.  No spontaneous respirations.  Absent heart and breath sounds.  Absent radial and carotid pulses.   Pupils are fixed and dilated, no corneal reflex.  EKG rhythm strip shows asystole.   Patient pronounced dead at 3:49AM.  Attending notified. DEATH NOTE    Called to bedside to evaluate the patient for pulselessness.     On physical exam, patient did not respond to verbal or noxious stimuli.  No spontaneous respirations.  Absent heart and breath sounds.  Absent radial and carotid pulses.   Pupils are fixed and dilated, no corneal reflex.  EKG rhythm strip shows asystole.   Patient pronounced dead at 3:49AM.  Attending notified.

## 2024-09-24 NOTE — RAPID RESPONSE TEAM SUMMARY - NSADDTLFINDINGSRRT_GEN_ALL_CORE
Patient pulseless upon examination. ACLS initiated with one round of chest compressions completed with rhythm check showing PEA, no pulse. Chest compressions resumed and 1x epinephrine 1mg IVP given. During second rounds of chest compressions patients family member at bedside informed us that patient earlier in the evening decided that he wished to be DNR/DNI. Chest compressions were stopped. Asystole on the Zoll monitor. Cardiopulmonary arrest confirmed on examination.

## 2024-09-24 NOTE — DISCHARGE NOTE FOR THE EXPIRED PATIENT - HOSPITAL COURSE
Pt is a 75 y/o male with pmhx of BPH, HLD, and recent diagnosis of cholangiocarcinoma, diagnosed on 9/10, s/p failed biliary stent placement 9/18 who presented to ED for weakness, mainly in the bilateral lower extremities x 3 days.  Per son, Velma, pt was in his usual state of health until 8/25 when the family noticed his jaundiced skin and eyes. Pt was evaluated by his PCP, Dr. Subramanian Hai - US from 9/4/24 showed new biliary dilatation and thickened gallbladder wall up to 3mm. 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 The pt was then evaluated by GI doctor, Dr. Wade White at [facility], who performed ERCP on 9/18. During this procedure, patient was noted to have  biliary tract obstruction 2/2 a mass in the upper third of the main duct with malignant appearing severe biliary stricture; a biliary sphincterotomy was performed. The plan was for percutaneous drainage. Never had a EGD or colonoscopy. Patient also finished 3 days of Cipro 500mg BID.     On 9/21, the patient went to [place] ED for weakness on 9/21 morning, dc-ed with Augmentin 875mg which pt took 2 dose of it. He presented to [facility] ED on 9/23 for same, found to be in fulminant hepatic failure with transaminitis and CT findings confirming a diagnosis of cholangiocarcinoma. GI was consulted for possible ERCP for management of biliary ductal dilation and in the setting of acute liver failure. CT of the spine was unimpressive for spinal cord compression.  On the night of 9/23, patient was noted to have 2 bloody BM. Patient was evaluated at the bedside and labs were drawn to assess for blood loss. At the time, patient was deemed hemodynamically stable and repeat labs were ordered to monitor the trend in hemoglobin. At around half past three on the 24th, a code blue was called when the patient was noted to be unresponsive by the pt's son. The patient indicated that his father did not want any further life sustaining interventions. The patient was pronounced dead at 3:49AM.    Pt is a 75 y/o male with pmhx of BPH, HLD, and recent diagnosis of cholangiocarcinoma, diagnosed on 9/10, s/p failed biliary stent placement 9/18 who presented to ED for weakness, mainly in the bilateral lower extremities x 3 days.  Per son, Velma, pt was in his usual state of health until 8/25 when the family noticed his jaundiced skin and eyes. Pt was evaluated by his PCP, Dr. Subramanian Hai - US from 9/4/24 showed new biliary dilatation and thickened gallbladder wall up to 3mm. 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 The pt was then evaluated by GI doctor, Dr. Wade White at [facility], who performed ERCP on 9/18. During this procedure, patient was noted to have  biliary tract obstruction 2/2 a mass in the upper third of the main duct with malignant appearing severe biliary stricture; a biliary sphincterotomy was performed. The plan was for percutaneous drainage. Never had a EGD or colonoscopy. Patient also finished 3 days of Cipro 500mg BID.     On 9/21, the patient went to [place] ED for weakness on 9/21 morning, dc-ed with Augmentin 875mg which pt took 2 dose of it. He presented to [facility] ED on 9/23 for same, found to be in fulminant hepatic failure with transaminitis and CT findings confirming a diagnosis of cholangiocarcinoma. GI was consulted for possible ERCP for management of biliary ductal dilation and in the setting of acute liver failure. Neurology was also consulted for the bilateral lower extremity weakness. CT of the spine was unimpressive for spinal cord compression.  On the night of 9/23, patient was noted to have 2 bloody BM. Patient was evaluated at the bedside and labs were drawn to assess for blood loss. At the time, patient was deemed hemodynamically stable and repeat labs were ordered to monitor the trend in hemoglobin. At around half past three on the 24th, a code blue was called when the patient was noted to be unresponsive by the pt's son. The patient indicated that his father did not want any further life sustaining interventions. The patient was pronounced dead at 3:49AM.

## 2024-09-24 NOTE — RAPID RESPONSE TEAM SUMMARY - NSREASONFORCALLINGRRT_GEN_ALL_CORE
Arrythmia
Diabetes mellitus type II, non insulin dependent

## 2024-09-24 NOTE — RAPID RESPONSE TEAM SUMMARY - NSSITUATIONBACKGROUNDRRT_GEN_ALL_CORE
73 y/o male with pmhx of rent diagnosis of cholangiocarcinoma s/p failed biliary stent placement due to mass size on 9/18, BPH, and HLD, admitted for weakness, mainly in the bilateral lower extremities x 3 days. Exam significant for jaundice, scleral icterus, and strength weakness in all extremities. Labs notable for lactate of 6.7, BUN 27, T Bili 18, Alk Phos 647, AST 2231, . lipase 67. pH 7.3. UA sig for large blood, large bili, small leuk.     CT CAP with a mass at the confluence of the biliary tree concerning for cholangiocarcinoma with vascular involvement as above. Moderate to severe right and left intrahepatic biliary ductal dilatation to the level of the tumor. Isolated right and left hepatic ducts. CT C spine with degenerative changes CT T and L spine without destructive changes    CODE BLUE called for patient unresponsive and pulseless.

## 2024-10-09 RX ORDER — ATORVASTATIN CALCIUM 10 MG/1
1 TABLET, FILM COATED ORAL
Refills: 0 | DISCHARGE

## 2024-10-09 RX ORDER — TAMSULOSIN HCL 0.4 MG
1 CAPSULE ORAL
Refills: 0 | DISCHARGE

## 2024-10-09 RX ORDER — METOPROLOL TARTRATE 50 MG
1 TABLET ORAL
Refills: 0 | DISCHARGE